# Patient Record
(demographics unavailable — no encounter records)

---
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
## 2025-02-14 NOTE — PHYSICAL EXAM
[de-identified] : normal breath sounds, respirations even and unlabored, mild mid sternal tenderness upon palpation, no obvious sternal instability on exam [de-identified] : soft, not tender, not distended
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
## 2025-07-10 NOTE — PHYSICAL EXAM
[No Acute Distress] : no acute distress [Nasal congestion] : nasal congestion [II] : Mallampati Class: II [Normal Appearance] : normal appearance [No Neck Mass] : no neck mass [Normal Rate/Rhythm] : normal rate/rhythm [Normal S1, S2] : normal s1, s2 [No Resp Distress] : no resp distress [Clear to Auscultation Bilaterally] : clear to auscultation bilaterally [No Abnormalities] : no abnormalities [Benign] : benign [Normal Gait] : normal gait [No Clubbing] : no clubbing [No Cyanosis] : no cyanosis [No Edema] : no edema [Normal Color/ Pigmentation] : normal color/ pigmentation [Oriented x3] : oriented x3 [Normal Affect] : normal affect [TextBox_11] : COBBLE STONED [TextBox_132] : GROSSLY INTACT
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
## 2025-07-10 NOTE — REVIEW OF SYSTEMS
[Nasal Congestion] : nasal congestion [Postnasal Drip] : postnasal drip [Sinus Problems] : sinus problems [Cough] : cough [Sputum] : sputum [Seasonal Allergies] : seasonal allergies [GERD] : gerd [Nocturia] : nocturia [Back Pain] : back pain [Trauma/ Injury] : trauma/ injury [Negative] : Endocrine [Hemoptysis] : no hemoptysis [Dyspnea] : no dyspnea [Pleuritic Pain] : no pleuritic pain [Abdominal Pain] : no abdominal pain [Nausea] : no nausea [Vomiting] : no vomiting [Dysphagia] : no dysphagia